# Patient Record
Sex: MALE | URBAN - METROPOLITAN AREA
[De-identification: names, ages, dates, MRNs, and addresses within clinical notes are randomized per-mention and may not be internally consistent; named-entity substitution may affect disease eponyms.]

---

## 2020-07-15 ENCOUNTER — APPOINTMENT (RX ONLY)
Dept: URBAN - METROPOLITAN AREA CLINIC 31 | Facility: CLINIC | Age: 70
Setting detail: DERMATOLOGY
End: 2020-07-15

## 2020-07-15 VITALS — TEMPERATURE: 98.1 F

## 2020-07-15 DIAGNOSIS — D22 MELANOCYTIC NEVI: ICD-10-CM

## 2020-07-15 DIAGNOSIS — L82.1 OTHER SEBORRHEIC KERATOSIS: ICD-10-CM

## 2020-07-15 DIAGNOSIS — L28.1 PRURIGO NODULARIS: ICD-10-CM

## 2020-07-15 PROBLEM — L30.9 DERMATITIS, UNSPECIFIED: Status: ACTIVE | Noted: 2020-07-15

## 2020-07-15 PROBLEM — D48.5 NEOPLASM OF UNCERTAIN BEHAVIOR OF SKIN: Status: ACTIVE | Noted: 2020-07-15

## 2020-07-15 PROCEDURE — ? OBSERVATION AND MEASURE

## 2020-07-15 PROCEDURE — ? BIOPSY BY SHAVE METHOD

## 2020-07-15 PROCEDURE — ? COUNSELING

## 2020-07-15 PROCEDURE — 11102 TANGNTL BX SKIN SINGLE LES: CPT

## 2020-07-15 PROCEDURE — 99202 OFFICE O/P NEW SF 15 MIN: CPT | Mod: 25

## 2020-07-15 PROCEDURE — ? ADDITIONAL NOTES

## 2020-07-15 PROCEDURE — 11103 TANGNTL BX SKIN EA SEP/ADDL: CPT

## 2020-07-15 ASSESSMENT — LOCATION ZONE DERM
LOCATION ZONE: ARM
LOCATION ZONE: FACE
LOCATION ZONE: NECK

## 2020-07-15 ASSESSMENT — LOCATION DETAILED DESCRIPTION DERM
LOCATION DETAILED: RIGHT DISTAL POSTERIOR UPPER ARM
LOCATION DETAILED: LEFT SUPERIOR POSTERIOR NECK
LOCATION DETAILED: LEFT MID PREAURICULAR CHEEK

## 2020-07-15 ASSESSMENT — LOCATION SIMPLE DESCRIPTION DERM
LOCATION SIMPLE: POSTERIOR NECK
LOCATION SIMPLE: RIGHT UPPER ARM
LOCATION SIMPLE: LEFT CHEEK

## 2020-07-15 NOTE — PROCEDURE: ADDITIONAL NOTES
Detail Level: Detailed
Additional Notes: Spot mentioned on intake. Pt advised it does not appear suspicious for skin cancer. He requests removal.

## 2020-07-15 NOTE — PROCEDURE: BIOPSY BY SHAVE METHOD
Detail Level: Detailed
Depth Of Biopsy: dermis
Was A Bandage Applied: Yes
Size Of Lesion In Cm: 1.2
X Size Of Lesion In Cm: 0
Biopsy Type: H and E
Biopsy Method: Personna blade
Anesthesia Type: 1% lidocaine with epinephrine
Anesthesia Volume In Cc: 0.5
Hemostasis: Drysol and Electrocautery
Wound Care: Vaseline
Dressing: bandage
Destruction After The Procedure: No
Type Of Destruction Used: Curettage
Curettage Text: The wound bed was treated with curettage after the biopsy was performed.
Cryotherapy Text: The wound bed was treated with cryotherapy after the biopsy was performed.
Electrodesiccation Text: The wound bed was treated with electrodesiccation after the biopsy was performed.
Electrodesiccation And Curettage Text: The wound bed was treated with electrodesiccation and curettage after the biopsy was performed.
Silver Nitrate Text: The wound bed was treated with silver nitrate after the biopsy was performed.
Lab: [id number]
Lab Facility: 
Consent: Written consent was obtained and risks were reviewed including but not limited to scarring, infection, bleeding, scabbing, incomplete removal, nerve damage and allergy to anesthesia.
Post-Care Instructions: I reviewed with the patient in detail post-care instructions. Patient is to keep the biopsy site dry overnight, and then apply vaseline twice daily until healed.
Notification Instructions: Patient will be notified of biopsy results. However, patient instructed to call the office if not contacted within 2 weeks.
Billing Type: Third-Party Bill
Information: Selecting Yes will display possible errors in your note based on the variables you have selected. This validation is only offered as a suggestion for you. PLEASE NOTE THAT THE VALIDATION TEXT WILL BE REMOVED WHEN YOU FINALIZE YOUR NOTE. IF YOU WANT TO FAX A PRELIMINARY NOTE YOU WILL NEED TO TOGGLE THIS TO 'NO' IF YOU DO NOT WANT IT IN YOUR FAXED NOTE.
Size Of Lesion In Cm: 0.9
Lab: [id number]
Lab Facility: [id number]

## 2020-10-15 ENCOUNTER — APPOINTMENT (RX ONLY)
Dept: URBAN - METROPOLITAN AREA CLINIC 31 | Facility: CLINIC | Age: 70
Setting detail: DERMATOLOGY
End: 2020-10-15

## 2020-10-15 VITALS — TEMPERATURE: 97.3 F

## 2020-10-15 DIAGNOSIS — D22 MELANOCYTIC NEVI: ICD-10-CM

## 2020-10-15 DIAGNOSIS — D18.0 HEMANGIOMA: ICD-10-CM

## 2020-10-15 DIAGNOSIS — L82.1 OTHER SEBORRHEIC KERATOSIS: ICD-10-CM

## 2020-10-15 DIAGNOSIS — L90.5 SCAR CONDITIONS AND FIBROSIS OF SKIN: ICD-10-CM

## 2020-10-15 DIAGNOSIS — L81.4 OTHER MELANIN HYPERPIGMENTATION: ICD-10-CM

## 2020-10-15 DIAGNOSIS — Z71.89 OTHER SPECIFIED COUNSELING: ICD-10-CM

## 2020-10-15 PROBLEM — D22.5 MELANOCYTIC NEVI OF TRUNK: Status: ACTIVE | Noted: 2020-10-15

## 2020-10-15 PROBLEM — D18.01 HEMANGIOMA OF SKIN AND SUBCUTANEOUS TISSUE: Status: ACTIVE | Noted: 2020-10-15

## 2020-10-15 PROCEDURE — ? ADDITIONAL NOTES

## 2020-10-15 PROCEDURE — ? COUNSELING

## 2020-10-15 PROCEDURE — 99213 OFFICE O/P EST LOW 20 MIN: CPT

## 2020-10-15 ASSESSMENT — LOCATION DETAILED DESCRIPTION DERM
LOCATION DETAILED: RIGHT POSTERIOR SHOULDER
LOCATION DETAILED: PERIUMBILICAL SKIN
LOCATION DETAILED: LEFT POSTERIOR SHOULDER
LOCATION DETAILED: LEFT INFERIOR UPPER BACK
LOCATION DETAILED: LEFT SUPERIOR POSTERIOR NECK
LOCATION DETAILED: SUPERIOR THORACIC SPINE

## 2020-10-15 ASSESSMENT — LOCATION ZONE DERM
LOCATION ZONE: TRUNK
LOCATION ZONE: ARM
LOCATION ZONE: NECK

## 2020-10-15 ASSESSMENT — LOCATION SIMPLE DESCRIPTION DERM
LOCATION SIMPLE: LEFT SHOULDER
LOCATION SIMPLE: UPPER BACK
LOCATION SIMPLE: LEFT UPPER BACK
LOCATION SIMPLE: RIGHT SHOULDER
LOCATION SIMPLE: POSTERIOR NECK
LOCATION SIMPLE: ABDOMEN

## 2020-10-15 NOTE — HPI: FULL BODY SKIN EXAMINATION
How Severe Are Your Spot(S)?: mild
What Is The Reason For Today's Visit?: Full Body Skin Examination
What Is The Reason For Today's Visit? (Being Monitored For X): concerning skin lesions on an annual basis
Additional History: Patient denies any changing moles or tender or bleeding spots.

## 2024-07-18 ENCOUNTER — HOSPITAL ENCOUNTER (OUTPATIENT)
Dept: RADIOLOGY | Facility: MEDICAL CENTER | Age: 74
End: 2024-07-18
Attending: UROLOGY
Payer: COMMERCIAL

## 2024-07-18 DIAGNOSIS — C61 MALIGNANT NEOPLASM OF PROSTATE (HCC): ICD-10-CM

## 2024-07-18 PROCEDURE — A9595 CT-PETCT-PROSTATE SCAN SKULL BASE TO MID-THIGH (PYLARIFY OR AXUMIN): HCPCS

## 2024-09-17 ENCOUNTER — OFFICE VISIT (OUTPATIENT)
Dept: UROLOGY | Facility: MEDICAL CENTER | Age: 74
End: 2024-09-17
Payer: COMMERCIAL

## 2024-09-17 DIAGNOSIS — C61 PROSTATE CANCER (HCC): ICD-10-CM

## 2024-09-17 PROCEDURE — 99204 OFFICE O/P NEW MOD 45 MIN: CPT | Performed by: UROLOGY

## 2024-09-17 NOTE — PROGRESS NOTES
Chief Complaint: prostate cancer    The patient was referred by Juan José Will M.D. for evaluation of the above chief complaint    History of Present Illness:    Petar Baez is a 73 y.o. male who presents today for prostate cancer  - History of prostate cancer diagnosed 2020  - PSA 16 ng/mL at that time, Nickolas 3+4=7 disease  - Received ADT + LDR brachytherapy in 8/2020  - PSA nadired to <1 ng/mL  - PSA has risen to ~3 ng/mL  - PSMA PET performed 7/2024 with focal area of recurrence in the prostate  - MRI 9/2024 with ?SV recurrence and bladder lesion  - Currently scheduled with Dr. Buckley for transperineal prostate biopsy on 9/20     Subjective    No family history on file.  Social History     Socioeconomic History    Marital status:      Spouse name: Not on file    Number of children: Not on file    Years of education: Not on file    Highest education level: Not on file   Occupational History    Not on file   Tobacco Use    Smoking status: Not on file    Smokeless tobacco: Not on file   Substance and Sexual Activity    Alcohol use: Not on file    Drug use: Not on file    Sexual activity: Not on file   Other Topics Concern    Not on file   Social History Narrative    Not on file     Social Determinants of Health     Financial Resource Strain: Not on file   Food Insecurity: Not on file   Transportation Needs: Not on file   Physical Activity: Not on file   Stress: Not on file   Social Connections: Not on file   Intimate Partner Violence: Not on file   Housing Stability: Not on file     No past surgical history on file.  No past medical history on file.  No current outpatient medications on file.     No current facility-administered medications for this visit.     No Known Allergies    Review of systems was conducted and was negative except for as explicitly listed in the History of Present Illness.       Objective   There were no vitals taken for this visit.  Physical Exam  Vitals reviewed.   HENT:       Head: Normocephalic.      Nose: Nose normal.      Mouth/Throat:      Pharynx: Oropharynx is clear.   Eyes:      Conjunctiva/sclera: Conjunctivae normal.   Cardiovascular:      Rate and Rhythm: Normal rate.      Pulses: Normal pulses.   Pulmonary:      Effort: Pulmonary effort is normal.   Abdominal:      Palpations: Abdomen is soft.   Musculoskeletal:         General: Normal range of motion.      Cervical back: Neck supple.   Skin:     General: Skin is warm.   Neurological:      Mental Status: He is alert. Mental status is at baseline.   Psychiatric:         Mood and Affect: Mood normal.         Lab/Radiology/Diagnostic Review:  [facility]   Lab Results   Component Value Date/Time    SODIUM 137 12/04/2020 0716    POTASSIUM 4.6 12/04/2020 0716    CHLORIDE 104 12/04/2020 0716    CO2 18 (L) 12/04/2020 0716    GLUCOSE 108 (H) 12/04/2020 0716    BUN 11 12/04/2020 0716    CREATININE 0.96 12/04/2020 0716    CALCIUM 9.1 12/04/2020 0716     MRI PROSTATE   MR-RECTAL/PROSTATE PROTOCOL 08/19/2024    Narrative  EXAM DATE:  8/19/2024 2:15 PM    INDICATION: Rising PSA with focal uptake near the prostate base on Plarify PET/CT    PROCEDURE: MRI PROSTATE W WO CONTRAST    COMPARISON: Correlation with PET/CT 7/18/2024    TECHNIQUE: Multiparametric prostate MR was performed without and with 19 mL Clariscan intravenous gadolinium contrast on 3T Siemens Magnetom Gisella magnet with 18-channel torso coil. Small field of view multiplanar T2, diffusion weighted (b values 50, 800, 1500), and dynamic contrast enhanced sequences (power injected, 2cc/sec) were performed with post processing (K-trans, KEP, and VE parametric maps).    FINDINGS:    Prostate measures 3.8 x 4.6 x 3.5 cm (approximately 31.8 cc).    Numerous foci of low low signal artifact are present from brachytherapy seeds.  There is obscuration of zonal anatomy and ill-defined immediate T2 signal changes of prostate parenchyma related to previous treatment.    Inferior vesicles at midline  and extending to the right show heterogeneous low T2 signal which shows curvilinear mild restricted diffusion which is felt to correspond to the focal uptake on PET.  There is associated positive dynamic contrast enhancement.  This abnormality within the seminal vesicles measures up to 2.6 x 1.3 cm in axial dimensions on series 3 image 9.  No suspicious lesions seen within the prostate gland.    Within the dependent bladder extending from the right ureteral orifice towards midline abnormal intermediate to mildly hyperintense T2 signal measuring up to 1.4 x 0.4 cm on series 3 image 7.  A mucosal lesion cannot be excluded and correlation with cystoscopy recommended.  Bladder wall shows areas of thickening suggest chronic cystitis.    No enlarged or suspicious pelvic lymph nodes seen.  No aggressive bone lesion.    There are colonic diverticula.    Impression  Posttreatment changes of the prostate with brachytherapy seeds in place.  2.6 x 1.3 cm signal abnormality showing mild curvilinear restricted diffusion within the inferior seminal vesicles extending to the right which is felt to correspond to the focal uptake on PET and suspicious for malignancy.    Abnormal signal within the dependent urinary bladder.  Small mucosal lesion is not excluded.  Correlation with cystoscopy is recommended.    PSMA PET 7/2024:  IMPRESSION:        1. Focal increased uptake in the right prostate gland near the base, in keeping with recurrent disease.  2. No lymph node or osseous metastasis.    I have reviewed and independently examined the lab and imaging results.  My findings are given in the HPI or above with the associated tests.        Assessment & Plan    It was a pleasure speaking with Petar Baez today.    Petar Baez is a 73 y.o. male w/ prostate cancer  - Discussed AUA and NCCN guidelines  - Agree with transperineal prostate biopsy, specifically sampling bilateral seminal vesicles and the prostate  - Advocate for  multidisciplinary conference / tumor board discussion pending the biopsy results  - Briefly discussed salvage options for him: 1) salvage XRT, 2) salvage prostatectomy, 3) salvage focal therapy, 4) active surveillance, 5) watchful waiting, 6) intermittent ADT  - We discussed salvage prostatectomy in more detail  - Risks, benefits, alternatives were discussed with the patient.  Specifically, we reviewed the perioperative expectations.  All questions answered.    - We discussed the high rate of permanent incontinence after salvage prostatectomy  - We also discussed the relatively higher rate of GI injury during salvage surgery but overall this risk is still very low (1% or less)